# Patient Record
Sex: FEMALE | Race: WHITE | NOT HISPANIC OR LATINO | URBAN - METROPOLITAN AREA
[De-identification: names, ages, dates, MRNs, and addresses within clinical notes are randomized per-mention and may not be internally consistent; named-entity substitution may affect disease eponyms.]

---

## 2019-08-02 ENCOUNTER — OUTPATIENT (OUTPATIENT)
Dept: OUTPATIENT SERVICES | Facility: HOSPITAL | Age: 39
LOS: 1 days | End: 2019-08-02
Payer: COMMERCIAL

## 2019-08-02 DIAGNOSIS — Z01.818 ENCOUNTER FOR OTHER PREPROCEDURAL EXAMINATION: ICD-10-CM

## 2019-08-02 LAB
BLD GP AB SCN SERPL QL: NEGATIVE — SIGNIFICANT CHANGE UP
HCT VFR BLD CALC: 41.8 % — SIGNIFICANT CHANGE UP (ref 34.5–45)
HGB BLD-MCNC: 13.7 G/DL — SIGNIFICANT CHANGE UP (ref 11.5–15.5)
MCHC RBC-ENTMCNC: 30.9 PG — SIGNIFICANT CHANGE UP (ref 27–34)
MCHC RBC-ENTMCNC: 32.8 GM/DL — SIGNIFICANT CHANGE UP (ref 32–36)
MCV RBC AUTO: 94.4 FL — SIGNIFICANT CHANGE UP (ref 80–100)
NRBC # BLD: 0 /100 WBCS — SIGNIFICANT CHANGE UP (ref 0–0)
PLATELET # BLD AUTO: 174 K/UL — SIGNIFICANT CHANGE UP (ref 150–400)
RBC # BLD: 4.43 M/UL — SIGNIFICANT CHANGE UP (ref 3.8–5.2)
RBC # FLD: 13.5 % — SIGNIFICANT CHANGE UP (ref 10.3–14.5)
RH IG SCN BLD-IMP: NEGATIVE — SIGNIFICANT CHANGE UP
WBC # BLD: 15.33 K/UL — HIGH (ref 3.8–10.5)
WBC # FLD AUTO: 15.33 K/UL — HIGH (ref 3.8–10.5)

## 2019-08-02 PROCEDURE — 86900 BLOOD TYPING SEROLOGIC ABO: CPT

## 2019-08-02 PROCEDURE — 85027 COMPLETE CBC AUTOMATED: CPT

## 2019-08-02 PROCEDURE — 86850 RBC ANTIBODY SCREEN: CPT

## 2019-08-02 PROCEDURE — 86901 BLOOD TYPING SEROLOGIC RH(D): CPT

## 2019-08-09 ENCOUNTER — INPATIENT (INPATIENT)
Facility: HOSPITAL | Age: 39
LOS: 2 days | Discharge: HOME | End: 2019-08-12
Attending: OBSTETRICS & GYNECOLOGY | Admitting: OBSTETRICS & GYNECOLOGY
Payer: COMMERCIAL

## 2019-08-09 VITALS — WEIGHT: 209.44 LBS

## 2019-08-09 LAB
BASOPHILS # BLD AUTO: 0.03 K/UL — SIGNIFICANT CHANGE UP (ref 0–0.2)
BASOPHILS NFR BLD AUTO: 0.2 % — SIGNIFICANT CHANGE UP (ref 0–2)
EOSINOPHIL # BLD AUTO: 0.07 K/UL — SIGNIFICANT CHANGE UP (ref 0–0.5)
EOSINOPHIL NFR BLD AUTO: 0.5 % — SIGNIFICANT CHANGE UP (ref 0–6)
HCT VFR BLD CALC: 40.4 % — SIGNIFICANT CHANGE UP (ref 34.5–45)
HGB BLD-MCNC: 13.6 G/DL — SIGNIFICANT CHANGE UP (ref 11.5–15.5)
IMM GRANULOCYTES NFR BLD AUTO: 1.3 % — SIGNIFICANT CHANGE UP (ref 0–1.5)
LYMPHOCYTES # BLD AUTO: 16.2 % — SIGNIFICANT CHANGE UP (ref 13–44)
LYMPHOCYTES # BLD AUTO: 2.33 K/UL — SIGNIFICANT CHANGE UP (ref 1–3.3)
MCHC RBC-ENTMCNC: 30.9 PG — SIGNIFICANT CHANGE UP (ref 27–34)
MCHC RBC-ENTMCNC: 33.7 GM/DL — SIGNIFICANT CHANGE UP (ref 32–36)
MCV RBC AUTO: 91.8 FL — SIGNIFICANT CHANGE UP (ref 80–100)
MONOCYTES # BLD AUTO: 1.02 K/UL — HIGH (ref 0–0.9)
MONOCYTES NFR BLD AUTO: 7.1 % — SIGNIFICANT CHANGE UP (ref 2–14)
NEUTROPHILS # BLD AUTO: 10.74 K/UL — HIGH (ref 1.8–7.4)
NEUTROPHILS NFR BLD AUTO: 74.7 % — SIGNIFICANT CHANGE UP (ref 43–77)
NRBC # BLD: 0 /100 WBCS — SIGNIFICANT CHANGE UP (ref 0–0)
PLATELET # BLD AUTO: 180 K/UL — SIGNIFICANT CHANGE UP (ref 150–400)
RBC # BLD: 4.4 M/UL — SIGNIFICANT CHANGE UP (ref 3.8–5.2)
RBC # FLD: 13.5 % — SIGNIFICANT CHANGE UP (ref 10.3–14.5)
T PALLIDUM AB TITR SER: NEGATIVE — SIGNIFICANT CHANGE UP
WBC # BLD: 14.38 K/UL — HIGH (ref 3.8–10.5)
WBC # FLD AUTO: 14.38 K/UL — HIGH (ref 3.8–10.5)

## 2019-08-09 RX ORDER — ENOXAPARIN SODIUM 100 MG/ML
40 INJECTION SUBCUTANEOUS EVERY 24 HOURS
Refills: 0 | Status: DISCONTINUED | OUTPATIENT
Start: 2019-08-09 | End: 2019-08-12

## 2019-08-09 RX ORDER — SODIUM CHLORIDE 9 MG/ML
1000 INJECTION, SOLUTION INTRAVENOUS
Refills: 0 | Status: DISCONTINUED | OUTPATIENT
Start: 2019-08-09 | End: 2019-08-12

## 2019-08-09 RX ORDER — KETOROLAC TROMETHAMINE 30 MG/ML
30 SYRINGE (ML) INJECTION EVERY 6 HOURS
Refills: 0 | Status: DISCONTINUED | OUTPATIENT
Start: 2019-08-09 | End: 2019-08-10

## 2019-08-09 RX ORDER — NALOXONE HYDROCHLORIDE 4 MG/.1ML
0.1 SPRAY NASAL
Refills: 0 | Status: DISCONTINUED | OUTPATIENT
Start: 2019-08-09 | End: 2019-08-09

## 2019-08-09 RX ORDER — SODIUM CHLORIDE 9 MG/ML
1000 INJECTION, SOLUTION INTRAVENOUS
Refills: 0 | Status: DISCONTINUED | OUTPATIENT
Start: 2019-08-09 | End: 2019-08-09

## 2019-08-09 RX ORDER — ACETAMINOPHEN 500 MG
975 TABLET ORAL
Refills: 0 | Status: DISCONTINUED | OUTPATIENT
Start: 2019-08-09 | End: 2019-08-12

## 2019-08-09 RX ORDER — SODIUM CHLORIDE 9 MG/ML
1000 INJECTION, SOLUTION INTRAVENOUS ONCE
Refills: 0 | Status: COMPLETED | OUTPATIENT
Start: 2019-08-09 | End: 2019-08-09

## 2019-08-09 RX ORDER — OXYTOCIN 10 UNIT/ML
333.33 VIAL (ML) INJECTION
Qty: 20 | Refills: 0 | Status: DISCONTINUED | OUTPATIENT
Start: 2019-08-09 | End: 2019-08-09

## 2019-08-09 RX ORDER — DOCUSATE SODIUM 100 MG
100 CAPSULE ORAL
Refills: 0 | Status: DISCONTINUED | OUTPATIENT
Start: 2019-08-09 | End: 2019-08-12

## 2019-08-09 RX ORDER — ONDANSETRON 8 MG/1
4 TABLET, FILM COATED ORAL EVERY 6 HOURS
Refills: 0 | Status: DISCONTINUED | OUTPATIENT
Start: 2019-08-09 | End: 2019-08-09

## 2019-08-09 RX ORDER — CEFAZOLIN SODIUM 1 G
2000 VIAL (EA) INJECTION ONCE
Refills: 0 | Status: COMPLETED | OUTPATIENT
Start: 2019-08-09 | End: 2019-08-09

## 2019-08-09 RX ORDER — OXYCODONE HYDROCHLORIDE 5 MG/1
5 TABLET ORAL ONCE
Refills: 0 | Status: DISCONTINUED | OUTPATIENT
Start: 2019-08-09 | End: 2019-08-12

## 2019-08-09 RX ORDER — GLYCERIN ADULT
1 SUPPOSITORY, RECTAL RECTAL AT BEDTIME
Refills: 0 | Status: DISCONTINUED | OUTPATIENT
Start: 2019-08-09 | End: 2019-08-12

## 2019-08-09 RX ORDER — SIMETHICONE 80 MG/1
80 TABLET, CHEWABLE ORAL EVERY 4 HOURS
Refills: 0 | Status: DISCONTINUED | OUTPATIENT
Start: 2019-08-09 | End: 2019-08-12

## 2019-08-09 RX ORDER — DIPHENHYDRAMINE HCL 50 MG
25 CAPSULE ORAL EVERY 6 HOURS
Refills: 0 | Status: DISCONTINUED | OUTPATIENT
Start: 2019-08-09 | End: 2019-08-12

## 2019-08-09 RX ORDER — LANOLIN
1 OINTMENT (GRAM) TOPICAL EVERY 6 HOURS
Refills: 0 | Status: DISCONTINUED | OUTPATIENT
Start: 2019-08-09 | End: 2019-08-12

## 2019-08-09 RX ORDER — OXYCODONE HYDROCHLORIDE 5 MG/1
5 TABLET ORAL
Refills: 0 | Status: DISCONTINUED | OUTPATIENT
Start: 2019-08-09 | End: 2019-08-12

## 2019-08-09 RX ORDER — FAMOTIDINE 10 MG/ML
20 INJECTION INTRAVENOUS ONCE
Refills: 0 | Status: COMPLETED | OUTPATIENT
Start: 2019-08-09 | End: 2019-08-09

## 2019-08-09 RX ORDER — MAGNESIUM HYDROXIDE 400 MG/1
30 TABLET, CHEWABLE ORAL
Refills: 0 | Status: DISCONTINUED | OUTPATIENT
Start: 2019-08-09 | End: 2019-08-12

## 2019-08-09 RX ORDER — OXYTOCIN 10 UNIT/ML
333.33 VIAL (ML) INJECTION
Qty: 20 | Refills: 0 | Status: DISCONTINUED | OUTPATIENT
Start: 2019-08-09 | End: 2019-08-12

## 2019-08-09 RX ORDER — ONDANSETRON 8 MG/1
4 TABLET, FILM COATED ORAL ONCE
Refills: 0 | Status: COMPLETED | OUTPATIENT
Start: 2019-08-09 | End: 2019-08-09

## 2019-08-09 RX ORDER — IBUPROFEN 200 MG
600 TABLET ORAL EVERY 6 HOURS
Refills: 0 | Status: COMPLETED | OUTPATIENT
Start: 2019-08-09 | End: 2020-07-07

## 2019-08-09 RX ORDER — METOCLOPRAMIDE HCL 10 MG
10 TABLET ORAL ONCE
Refills: 0 | Status: DISCONTINUED | OUTPATIENT
Start: 2019-08-09 | End: 2019-08-09

## 2019-08-09 RX ORDER — CITRIC ACID/SODIUM CITRATE 300-500 MG
30 SOLUTION, ORAL ORAL ONCE
Refills: 0 | Status: COMPLETED | OUTPATIENT
Start: 2019-08-09 | End: 2019-08-09

## 2019-08-09 RX ORDER — TETANUS TOXOID, REDUCED DIPHTHERIA TOXOID AND ACELLULAR PERTUSSIS VACCINE, ADSORBED 5; 2.5; 8; 8; 2.5 [IU]/.5ML; [IU]/.5ML; UG/.5ML; UG/.5ML; UG/.5ML
0.5 SUSPENSION INTRAMUSCULAR ONCE
Refills: 0 | Status: COMPLETED | OUTPATIENT
Start: 2019-08-09

## 2019-08-09 RX ADMIN — SODIUM CHLORIDE 125 MILLILITER(S): 9 INJECTION, SOLUTION INTRAVENOUS at 07:29

## 2019-08-09 RX ADMIN — Medication 975 MILLIGRAM(S): at 14:33

## 2019-08-09 RX ADMIN — Medication 975 MILLIGRAM(S): at 15:15

## 2019-08-09 RX ADMIN — Medication 100 MILLIGRAM(S): at 07:36

## 2019-08-09 RX ADMIN — SODIUM CHLORIDE 125 MILLILITER(S): 9 INJECTION, SOLUTION INTRAVENOUS at 06:30

## 2019-08-09 RX ADMIN — SODIUM CHLORIDE 125 MILLILITER(S): 9 INJECTION, SOLUTION INTRAVENOUS at 14:40

## 2019-08-09 RX ADMIN — ENOXAPARIN SODIUM 40 MILLIGRAM(S): 100 INJECTION SUBCUTANEOUS at 22:11

## 2019-08-09 RX ADMIN — Medication 30 MILLIGRAM(S): at 10:40

## 2019-08-09 RX ADMIN — ONDANSETRON 4 MILLIGRAM(S): 8 TABLET, FILM COATED ORAL at 12:11

## 2019-08-09 RX ADMIN — Medication 30 MILLILITER(S): at 07:37

## 2019-08-09 RX ADMIN — Medication 975 MILLIGRAM(S): at 21:32

## 2019-08-09 RX ADMIN — Medication 30 MILLIGRAM(S): at 10:15

## 2019-08-09 RX ADMIN — SODIUM CHLORIDE 2000 MILLILITER(S): 9 INJECTION, SOLUTION INTRAVENOUS at 06:26

## 2019-08-09 RX ADMIN — FAMOTIDINE 20 MILLIGRAM(S): 10 INJECTION INTRAVENOUS at 07:35

## 2019-08-09 RX ADMIN — Medication 975 MILLIGRAM(S): at 22:00

## 2019-08-10 LAB
BASOPHILS # BLD AUTO: 0.03 K/UL — SIGNIFICANT CHANGE UP (ref 0–0.2)
BASOPHILS NFR BLD AUTO: 0.2 % — SIGNIFICANT CHANGE UP (ref 0–2)
EOSINOPHIL # BLD AUTO: 0.05 K/UL — SIGNIFICANT CHANGE UP (ref 0–0.5)
EOSINOPHIL NFR BLD AUTO: 0.3 % — SIGNIFICANT CHANGE UP (ref 0–6)
HCT VFR BLD CALC: 34.6 % — SIGNIFICANT CHANGE UP (ref 34.5–45)
HGB BLD-MCNC: 11.4 G/DL — LOW (ref 11.5–15.5)
IMM GRANULOCYTES NFR BLD AUTO: 0.8 % — SIGNIFICANT CHANGE UP (ref 0–1.5)
LYMPHOCYTES # BLD AUTO: 12.6 % — LOW (ref 13–44)
LYMPHOCYTES # BLD AUTO: 2.17 K/UL — SIGNIFICANT CHANGE UP (ref 1–3.3)
MCHC RBC-ENTMCNC: 31.1 PG — SIGNIFICANT CHANGE UP (ref 27–34)
MCHC RBC-ENTMCNC: 32.9 GM/DL — SIGNIFICANT CHANGE UP (ref 32–36)
MCV RBC AUTO: 94.3 FL — SIGNIFICANT CHANGE UP (ref 80–100)
MONOCYTES # BLD AUTO: 1.29 K/UL — HIGH (ref 0–0.9)
MONOCYTES NFR BLD AUTO: 7.5 % — SIGNIFICANT CHANGE UP (ref 2–14)
NEUTROPHILS # BLD AUTO: 13.48 K/UL — HIGH (ref 1.8–7.4)
NEUTROPHILS NFR BLD AUTO: 78.6 % — HIGH (ref 43–77)
NRBC # BLD: 0 /100 WBCS — SIGNIFICANT CHANGE UP (ref 0–0)
PLATELET # BLD AUTO: 151 K/UL — SIGNIFICANT CHANGE UP (ref 150–400)
RBC # BLD: 3.67 M/UL — LOW (ref 3.8–5.2)
RBC # FLD: 13.8 % — SIGNIFICANT CHANGE UP (ref 10.3–14.5)
WBC # BLD: 17.16 K/UL — HIGH (ref 3.8–10.5)
WBC # FLD AUTO: 17.16 K/UL — HIGH (ref 3.8–10.5)

## 2019-08-10 RX ORDER — IBUPROFEN 200 MG
600 TABLET ORAL EVERY 6 HOURS
Refills: 0 | Status: DISCONTINUED | OUTPATIENT
Start: 2019-08-10 | End: 2019-08-12

## 2019-08-10 RX ADMIN — Medication 975 MILLIGRAM(S): at 08:50

## 2019-08-10 RX ADMIN — Medication 975 MILLIGRAM(S): at 23:00

## 2019-08-10 RX ADMIN — Medication 100 MILLIGRAM(S): at 12:24

## 2019-08-10 RX ADMIN — Medication 30 MILLIGRAM(S): at 00:40

## 2019-08-10 RX ADMIN — Medication 30 MILLIGRAM(S): at 13:00

## 2019-08-10 RX ADMIN — Medication 975 MILLIGRAM(S): at 09:30

## 2019-08-10 RX ADMIN — Medication 30 MILLIGRAM(S): at 12:24

## 2019-08-10 RX ADMIN — Medication 600 MILLIGRAM(S): at 19:30

## 2019-08-10 RX ADMIN — Medication 600 MILLIGRAM(S): at 18:47

## 2019-08-10 RX ADMIN — Medication 975 MILLIGRAM(S): at 22:00

## 2019-08-10 NOTE — DISCHARGE NOTE OB - PATIENT PORTAL LINK FT
You can access the [facility] Patient Portal, offered by [facility], by registering with the following website: [email or web address]

## 2019-08-10 NOTE — DISCHARGE NOTE OB - CARE PROVIDER_API CALL
Jin Chen)  Obstetrics and Gynecology  [address] [place] [address], [address]  [place] [address]  Phone: [phone]  Fax: [phone]  Follow Up Time:

## 2019-08-11 RX ADMIN — ENOXAPARIN SODIUM 40 MILLIGRAM(S): 100 INJECTION SUBCUTANEOUS at 21:11

## 2019-08-11 RX ADMIN — Medication 100 MILLIGRAM(S): at 11:47

## 2019-08-11 RX ADMIN — Medication 600 MILLIGRAM(S): at 11:47

## 2019-08-11 RX ADMIN — ENOXAPARIN SODIUM 40 MILLIGRAM(S): 100 INJECTION SUBCUTANEOUS at 01:30

## 2019-08-11 RX ADMIN — Medication 600 MILLIGRAM(S): at 01:29

## 2019-08-11 RX ADMIN — Medication 1 APPLICATION(S): at 21:11

## 2019-08-11 RX ADMIN — MAGNESIUM HYDROXIDE 30 MILLILITER(S): 400 TABLET, CHEWABLE ORAL at 21:11

## 2019-08-11 RX ADMIN — Medication 600 MILLIGRAM(S): at 18:45

## 2019-08-11 RX ADMIN — Medication 975 MILLIGRAM(S): at 10:00

## 2019-08-11 RX ADMIN — Medication 975 MILLIGRAM(S): at 15:43

## 2019-08-11 RX ADMIN — Medication 975 MILLIGRAM(S): at 16:15

## 2019-08-11 RX ADMIN — Medication 975 MILLIGRAM(S): at 05:13

## 2019-08-11 RX ADMIN — Medication 600 MILLIGRAM(S): at 12:15

## 2019-08-11 RX ADMIN — Medication 600 MILLIGRAM(S): at 18:15

## 2019-08-11 RX ADMIN — Medication 100 MILLIGRAM(S): at 21:11

## 2019-08-11 NOTE — LACTATION INITIAL EVALUATION - NS LACT CON REASON FOR REQ
mom requests consult to go over latch, positioning, second day baby syndrome and frequency of feeds. Baby was <24 hours of age at time of consult and discussed with mom ad arun feeds with no more than 3 hours elapsing between start of feeds. Father present at bedside during consult./multiparous mom/general questions without assessment

## 2019-08-11 NOTE — LACTATION INITIAL EVALUATION - LACTATION INTERVENTIONS
patient was instructed to arouse baby fully prior to latching on to the breast/initiate skin to skin/initiate hand expression routine

## 2019-08-12 VITALS
HEART RATE: 73 BPM | RESPIRATION RATE: 18 BRPM | OXYGEN SATURATION: 100 % | SYSTOLIC BLOOD PRESSURE: 101 MMHG | TEMPERATURE: 98 F | DIASTOLIC BLOOD PRESSURE: 66 MMHG

## 2019-08-12 LAB
ALBUMIN SERPL ELPH-MCNC: 3.4 G/DL — SIGNIFICANT CHANGE UP (ref 3.3–5)
ALP SERPL-CCNC: 83 U/L — SIGNIFICANT CHANGE UP (ref 40–120)
ALT FLD-CCNC: 30 U/L — SIGNIFICANT CHANGE UP (ref 10–45)
ANION GAP SERPL CALC-SCNC: 11 MMOL/L — SIGNIFICANT CHANGE UP (ref 5–17)
APTT BLD: 28.6 SEC — SIGNIFICANT CHANGE UP (ref 27.5–36.3)
AST SERPL-CCNC: 31 U/L — SIGNIFICANT CHANGE UP (ref 10–40)
BASOPHILS # BLD AUTO: 0.03 K/UL — SIGNIFICANT CHANGE UP (ref 0–0.2)
BASOPHILS NFR BLD AUTO: 0.3 % — SIGNIFICANT CHANGE UP (ref 0–2)
BILIRUB SERPL-MCNC: 0.2 MG/DL — SIGNIFICANT CHANGE UP (ref 0.2–1.2)
BUN SERPL-MCNC: 15 MG/DL — SIGNIFICANT CHANGE UP (ref 7–23)
CALCIUM SERPL-MCNC: 9.2 MG/DL — SIGNIFICANT CHANGE UP (ref 8.4–10.5)
CHLORIDE SERPL-SCNC: 106 MMOL/L — SIGNIFICANT CHANGE UP (ref 96–108)
CO2 SERPL-SCNC: 25 MMOL/L — SIGNIFICANT CHANGE UP (ref 22–31)
CREAT SERPL-MCNC: 0.65 MG/DL — SIGNIFICANT CHANGE UP (ref 0.5–1.3)
EOSINOPHIL # BLD AUTO: 0.22 K/UL — SIGNIFICANT CHANGE UP (ref 0–0.5)
EOSINOPHIL NFR BLD AUTO: 1.9 % — SIGNIFICANT CHANGE UP (ref 0–6)
FIBRINOGEN PPP-MCNC: 453 MG/DL — HIGH (ref 258–438)
GLUCOSE SERPL-MCNC: 108 MG/DL — HIGH (ref 70–99)
HCT VFR BLD CALC: 40.4 % — SIGNIFICANT CHANGE UP (ref 34.5–45)
HGB BLD-MCNC: 13.3 G/DL — SIGNIFICANT CHANGE UP (ref 11.5–15.5)
IMM GRANULOCYTES NFR BLD AUTO: 0.8 % — SIGNIFICANT CHANGE UP (ref 0–1.5)
INR BLD: 1.01 — SIGNIFICANT CHANGE UP (ref 0.88–1.16)
LDH SERPL L TO P-CCNC: 185 U/L — SIGNIFICANT CHANGE UP (ref 50–242)
LYMPHOCYTES # BLD AUTO: 2.32 K/UL — SIGNIFICANT CHANGE UP (ref 1–3.3)
LYMPHOCYTES # BLD AUTO: 20 % — SIGNIFICANT CHANGE UP (ref 13–44)
MCHC RBC-ENTMCNC: 31.2 PG — SIGNIFICANT CHANGE UP (ref 27–34)
MCHC RBC-ENTMCNC: 32.9 GM/DL — SIGNIFICANT CHANGE UP (ref 32–36)
MCV RBC AUTO: 94.8 FL — SIGNIFICANT CHANGE UP (ref 80–100)
MONOCYTES # BLD AUTO: 0.75 K/UL — SIGNIFICANT CHANGE UP (ref 0–0.9)
MONOCYTES NFR BLD AUTO: 6.5 % — SIGNIFICANT CHANGE UP (ref 2–14)
NEUTROPHILS # BLD AUTO: 8.17 K/UL — HIGH (ref 1.8–7.4)
NEUTROPHILS NFR BLD AUTO: 70.5 % — SIGNIFICANT CHANGE UP (ref 43–77)
NRBC # BLD: 0 /100 WBCS — SIGNIFICANT CHANGE UP (ref 0–0)
PLATELET # BLD AUTO: 229 K/UL — SIGNIFICANT CHANGE UP (ref 150–400)
POTASSIUM SERPL-MCNC: 3.8 MMOL/L — SIGNIFICANT CHANGE UP (ref 3.5–5.3)
POTASSIUM SERPL-SCNC: 3.8 MMOL/L — SIGNIFICANT CHANGE UP (ref 3.5–5.3)
PROT SERPL-MCNC: 6.5 G/DL — SIGNIFICANT CHANGE UP (ref 6–8.3)
PROTHROM AB SERPL-ACNC: 11.4 SEC — SIGNIFICANT CHANGE UP (ref 10–12.9)
RBC # BLD: 4.26 M/UL — SIGNIFICANT CHANGE UP (ref 3.8–5.2)
RBC # FLD: 13.9 % — SIGNIFICANT CHANGE UP (ref 10.3–14.5)
SODIUM SERPL-SCNC: 142 MMOL/L — SIGNIFICANT CHANGE UP (ref 135–145)
URATE SERPL-MCNC: 4.8 MG/DL — SIGNIFICANT CHANGE UP (ref 2.5–7)
WBC # BLD: 11.58 K/UL — HIGH (ref 3.8–10.5)
WBC # FLD AUTO: 11.58 K/UL — HIGH (ref 3.8–10.5)

## 2019-08-12 PROCEDURE — 85384 FIBRINOGEN ACTIVITY: CPT

## 2019-08-12 PROCEDURE — 80053 COMPREHEN METABOLIC PANEL: CPT

## 2019-08-12 PROCEDURE — 86850 RBC ANTIBODY SCREEN: CPT

## 2019-08-12 PROCEDURE — 90715 TDAP VACCINE 7 YRS/> IM: CPT

## 2019-08-12 PROCEDURE — 86901 BLOOD TYPING SEROLOGIC RH(D): CPT

## 2019-08-12 PROCEDURE — 83615 LACTATE (LD) (LDH) ENZYME: CPT

## 2019-08-12 PROCEDURE — 85730 THROMBOPLASTIN TIME PARTIAL: CPT

## 2019-08-12 PROCEDURE — 85610 PROTHROMBIN TIME: CPT

## 2019-08-12 PROCEDURE — 86900 BLOOD TYPING SEROLOGIC ABO: CPT

## 2019-08-12 PROCEDURE — 86780 TREPONEMA PALLIDUM: CPT

## 2019-08-12 PROCEDURE — 86922 COMPATIBILITY TEST ANTIGLOB: CPT

## 2019-08-12 PROCEDURE — 86921 COMPATIBILITY TEST INCUBATE: CPT

## 2019-08-12 PROCEDURE — 84550 ASSAY OF BLOOD/URIC ACID: CPT

## 2019-08-12 PROCEDURE — 85025 COMPLETE CBC W/AUTO DIFF WBC: CPT

## 2019-08-12 PROCEDURE — 36415 COLL VENOUS BLD VENIPUNCTURE: CPT

## 2019-08-12 RX ORDER — TETANUS TOXOID, REDUCED DIPHTHERIA TOXOID AND ACELLULAR PERTUSSIS VACCINE, ADSORBED 5; 2.5; 8; 8; 2.5 [IU]/.5ML; [IU]/.5ML; UG/.5ML; UG/.5ML; UG/.5ML
0.5 SUSPENSION INTRAMUSCULAR ONCE
Refills: 0 | Status: COMPLETED | OUTPATIENT
Start: 2019-08-12 | End: 2019-08-12

## 2019-08-12 RX ADMIN — Medication 600 MILLIGRAM(S): at 05:39

## 2019-08-12 RX ADMIN — Medication 600 MILLIGRAM(S): at 12:06

## 2019-08-12 RX ADMIN — Medication 100 MILLIGRAM(S): at 12:08

## 2019-08-12 RX ADMIN — Medication 975 MILLIGRAM(S): at 15:28

## 2019-08-12 RX ADMIN — Medication 600 MILLIGRAM(S): at 06:10

## 2019-08-12 RX ADMIN — TETANUS TOXOID, REDUCED DIPHTHERIA TOXOID AND ACELLULAR PERTUSSIS VACCINE, ADSORBED 0.5 MILLILITER(S): 5; 2.5; 8; 8; 2.5 SUSPENSION INTRAMUSCULAR at 12:07

## 2019-08-12 NOTE — PROGRESS NOTE ADULT - SUBJECTIVE AND OBJECTIVE BOX
Patient evaluated at bedside on on POD3 c/o 7/10 headache since this morning that is not improved with ibuprofen.  States she also feels some nasal congestion but denies visual changes, dizziness, sore throat, cough, chest pain, SOB, abdominal pain, N/V.  No sensitivity to light/sound.      Physical Exam:  Vital Signs Last 24 Hrs  T(C): 36.5 (12 Aug 2019 09:00), Max: 37.2 (11 Aug 2019 18:15)  T(F): 97.7 (12 Aug 2019 09:00), Max: 98.9 (11 Aug 2019 18:15)  HR: 73 (12 Aug 2019 09:00) (61 - 73)  BP: 101/66 (12 Aug 2019 09:00) (101/66 - 129/78)  BP(mean): --  RR: 18 (12 Aug 2019 09:00) (17 - 18)  SpO2: 100% (12 Aug 2019 09:00) (97% - 100%)    GA: comfortable in NAD  Lungs: no increased work of breather  Abd: soft, nontender, nondistended, no rebound or guarding, incision clean, dry and intact, uterus firm at midline and below umbilicus  : lochia WNL  Extremities: no swelling or calf tenderness, DTR 1+ b/l Patient evaluated at bedside on on POD3 c/o 7/10 headache since this morning that is not improved with ibuprofen.  States she also feels some nasal congestion but denies visual changes, dizziness, sore throat, cough, chest pain, SOB, abdominal pain, N/V.  No sensitivity to light/sound.  No change in pain with position, nothing makes the pain improve or worsen.    Physical Exam:  Vital Signs Last 24 Hrs  T(C): 36.5 (12 Aug 2019 09:00), Max: 37.2 (11 Aug 2019 18:15)  T(F): 97.7 (12 Aug 2019 09:00), Max: 98.9 (11 Aug 2019 18:15)  HR: 73 (12 Aug 2019 09:00) (61 - 73)  BP: 101/66 (12 Aug 2019 09:00) (101/66 - 129/78)  BP(mean): --  RR: 18 (12 Aug 2019 09:00) (17 - 18)  SpO2: 100% (12 Aug 2019 09:00) (97% - 100%)    GA: comfortable in NAD  Lungs: no increased work of breather  Abd: soft, nontender, nondistended, no rebound or guarding, incision clean, dry and intact, uterus firm at midline and below umbilicus  : lochia WNL  Extremities: no swelling or calf tenderness, DTR 1+ b/l

## 2019-08-12 NOTE — PROGRESS NOTE ADULT - ASSESSMENT
A/P: 39yo s/p  section, POD #3 with new onset headache since this morning; otherwise asymptomatic and normotensive  -  Will give Tylenol for pain  -  F/u full labs

## 2019-08-15 DIAGNOSIS — O34.211 MATERNAL CARE FOR LOW TRANSVERSE SCAR FROM PREVIOUS CESAREAN DELIVERY: ICD-10-CM

## 2019-08-15 DIAGNOSIS — O26.893 OTHER SPECIFIED PREGNANCY RELATED CONDITIONS, THIRD TRIMESTER: ICD-10-CM

## 2019-08-15 DIAGNOSIS — Z3A.39 39 WEEKS GESTATION OF PREGNANCY: ICD-10-CM

## 2019-08-15 DIAGNOSIS — E66.9 OBESITY, UNSPECIFIED: ICD-10-CM

## 2025-01-14 NOTE — DISCHARGE NOTE OB - DO NOT DIET OR “STARVE” YOURSELF INTO GETTING BACK TO PRE-PREGNANCY SHAPE
Please clarify prescription. Pended is 50mg BID. Active on her med list is 25mg BID    Please see below message    Pt states she is taking 50 mg BID   Statement Selected .